# Patient Record
Sex: FEMALE | Race: ASIAN | Employment: FULL TIME | URBAN - METROPOLITAN AREA
[De-identification: names, ages, dates, MRNs, and addresses within clinical notes are randomized per-mention and may not be internally consistent; named-entity substitution may affect disease eponyms.]

---

## 2017-06-12 ENCOUNTER — HOSPITAL ENCOUNTER (OUTPATIENT)
Dept: MAMMOGRAPHY | Age: 37
Discharge: HOME | End: 2017-06-12
Attending: FAMILY MEDICINE
Payer: COMMERCIAL

## 2017-06-12 DIAGNOSIS — Z12.39 SCREENING FOR BREAST CANCER: ICD-10-CM

## 2017-06-12 PROCEDURE — 77067 SCR MAMMO BI INCL CAD: CPT

## 2018-08-06 ENCOUNTER — HOSPITAL ENCOUNTER (OUTPATIENT)
Dept: MAMMOGRAPHY | Age: 38
Discharge: HOME | End: 2018-08-06
Attending: FAMILY MEDICINE
Payer: COMMERCIAL

## 2018-08-06 DIAGNOSIS — Z12.39 SCREENING FOR BREAST CANCER: ICD-10-CM

## 2018-08-06 PROCEDURE — 77067 SCR MAMMO BI INCL CAD: CPT

## 2019-09-30 ENCOUNTER — HOSPITAL ENCOUNTER (OUTPATIENT)
Dept: MAMMOGRAPHY | Age: 39
Discharge: HOME | End: 2019-09-30
Attending: FAMILY MEDICINE
Payer: COMMERCIAL

## 2019-09-30 DIAGNOSIS — Z12.31 VISIT FOR SCREENING MAMMOGRAM: ICD-10-CM

## 2019-09-30 PROCEDURE — 77067 SCR MAMMO BI INCL CAD: CPT

## 2020-09-21 ENCOUNTER — HOSPITAL ENCOUNTER (OUTPATIENT)
Dept: MAMMOGRAPHY | Age: 40
Discharge: HOME | End: 2020-09-21
Attending: FAMILY MEDICINE
Payer: COMMERCIAL

## 2020-09-21 DIAGNOSIS — Z12.39 SCREENING FOR BREAST CANCER: ICD-10-CM

## 2020-09-21 PROCEDURE — 77067 SCR MAMMO BI INCL CAD: CPT

## 2021-11-27 PROBLEM — O09.529 HIGH RISK PREGNANCY, MULTIGRAVIDA OF ADVANCED MATERNAL AGE: Status: ACTIVE | Noted: 2021-11-27

## 2022-01-24 PROBLEM — O36.5990 PREGNANCY AFFECTED BY FETAL GROWTH RESTRICTION: Status: ACTIVE | Noted: 2022-01-24

## 2022-01-24 PROBLEM — O09.522 HIGH RISK PREGNANCY, MULTIGRAVIDA OF ADVANCED MATERNAL AGE IN SECOND TRIMESTER: Status: ACTIVE | Noted: 2021-11-27

## 2022-03-19 PROBLEM — O36.5990 PREGNANCY AFFECTED BY FETAL GROWTH RESTRICTION: Status: ACTIVE | Noted: 2022-01-24

## 2022-04-02 PROBLEM — O09.523 HIGH RISK PREGNANCY, MULTIGRAVIDA OF ADVANCED MATERNAL AGE IN THIRD TRIMESTER: Status: ACTIVE | Noted: 2021-11-27

## 2022-05-19 VITALS — SYSTOLIC BLOOD PRESSURE: 114 MMHG | DIASTOLIC BLOOD PRESSURE: 78 MMHG | WEIGHT: 161 LBS | BODY MASS INDEX: 31.44 KG/M2

## 2022-05-23 ENCOUNTER — ROUTINE PRENATAL (OUTPATIENT)
Dept: OBGYN CLINIC | Age: 42
End: 2022-05-23
Payer: COMMERCIAL

## 2022-05-23 VITALS — DIASTOLIC BLOOD PRESSURE: 78 MMHG | BODY MASS INDEX: 31.44 KG/M2 | SYSTOLIC BLOOD PRESSURE: 118 MMHG | WEIGHT: 161 LBS

## 2022-05-23 DIAGNOSIS — O09.93 HIGH-RISK PREGNANCY IN THIRD TRIMESTER: Primary | ICD-10-CM

## 2022-05-23 DIAGNOSIS — O09.523 MULTIGRAVIDA OF ADVANCED MATERNAL AGE IN THIRD TRIMESTER: ICD-10-CM

## 2022-05-23 PROCEDURE — 76819 FETAL BIOPHYS PROFIL W/O NST: CPT | Performed by: OBSTETRICS & GYNECOLOGY

## 2022-05-23 PROCEDURE — 0502F SUBSEQUENT PRENATAL CARE: CPT | Performed by: OBSTETRICS & GYNECOLOGY

## 2022-05-24 NOTE — PROGRESS NOTES
41yo [id number] at 38w3d for LENNY:    No change in SVE (2cm). IOL sched for Friday, pt requests moving to Saturday. Will speak with MD on call. +FM, no VB.

## 2022-05-25 ENCOUNTER — TELEPHONE (OUTPATIENT)
Dept: OBGYN CLINIC | Age: 42
End: 2022-05-25

## 2022-05-25 NOTE — TELEPHONE ENCOUNTER
Per Dr. Bonita Powers call pt to let her know that induction will remain as scheduled on Friday AM (5/27/22). Called pt, no answer - LM instructing patient to please return my call. 5/26/22 called pt back, no answer - LM instructing patient to please return my call.  called back and I made him aware that induction is scheduled for 5/27/22 - 5:00 AM.  states he cannot get off work and does not have anyone to drop their child off at school.  is asking for induction to be moved to Saturday. Annabella Stockton called Dr. Sarita Ware and said she that she does not advise induction to be moved as it is medical indicated at 39 weeks for growth restrictions. If pt desires induction can be moved, give kick counts. Spoke back with  and he said they want to go in on Saturday instead, he requests 11:00AM instead of 7. Per Annabella Stockton, its 7:00 AM no exceptions.  states he has to work but if he does not have a choice he will make the [employer] work for Saturday. Hospital address given. Kick counts given,  states baby is moving fine and all the time.

## 2022-05-27 ENCOUNTER — PREP FOR PROCEDURE (OUTPATIENT)
Dept: OBGYN | Age: 42
End: 2022-05-27

## 2022-05-27 ENCOUNTER — TELEPHONE (OUTPATIENT)
Dept: OBGYN CLINIC | Age: 42
End: 2022-05-27

## 2022-05-27 RX ORDER — CLINDAMYCIN PHOSPHATE 900 MG/50ML
900 INJECTION INTRAVENOUS EVERY 8 HOURS
Status: CANCELLED | OUTPATIENT
Start: 2022-05-27

## 2022-05-27 RX ORDER — TERBUTALINE SULFATE 1 MG/ML
0.25 INJECTION, SOLUTION SUBCUTANEOUS ONCE
Status: CANCELLED | OUTPATIENT
Start: 2022-05-27 | End: 2022-05-27

## 2022-05-27 RX ORDER — SODIUM CHLORIDE 9 MG/ML
25 INJECTION, SOLUTION INTRAVENOUS PRN
Status: CANCELLED | OUTPATIENT
Start: 2022-05-27

## 2022-05-27 RX ORDER — SODIUM CHLORIDE, SODIUM LACTATE, POTASSIUM CHLORIDE, AND CALCIUM CHLORIDE .6; .31; .03; .02 G/100ML; G/100ML; G/100ML; G/100ML
500 INJECTION, SOLUTION INTRAVENOUS PRN
Status: CANCELLED | OUTPATIENT
Start: 2022-05-27

## 2022-05-27 RX ORDER — ONDANSETRON 2 MG/ML
4 INJECTION INTRAMUSCULAR; INTRAVENOUS EVERY 6 HOURS PRN
Status: CANCELLED | OUTPATIENT
Start: 2022-05-27

## 2022-05-27 RX ORDER — SODIUM CHLORIDE 0.9 % (FLUSH) 0.9 %
5-40 SYRINGE (ML) INJECTION PRN
Status: CANCELLED | OUTPATIENT
Start: 2022-05-27

## 2022-05-27 RX ORDER — SODIUM CHLORIDE 0.9 % (FLUSH) 0.9 %
5-40 SYRINGE (ML) INJECTION EVERY 12 HOURS SCHEDULED
Status: CANCELLED | OUTPATIENT
Start: 2022-05-27

## 2022-05-27 RX ORDER — SODIUM CHLORIDE, SODIUM LACTATE, POTASSIUM CHLORIDE, AND CALCIUM CHLORIDE .6; .31; .03; .02 G/100ML; G/100ML; G/100ML; G/100ML
1000 INJECTION, SOLUTION INTRAVENOUS PRN
Status: CANCELLED | OUTPATIENT
Start: 2022-05-27

## 2022-05-27 RX ORDER — SODIUM CHLORIDE, SODIUM LACTATE, POTASSIUM CHLORIDE, CALCIUM CHLORIDE 600; 310; 30; 20 MG/100ML; MG/100ML; MG/100ML; MG/100ML
INJECTION, SOLUTION INTRAVENOUS CONTINUOUS
Status: CANCELLED | OUTPATIENT
Start: 2022-05-27

## 2022-05-27 RX ORDER — BUTORPHANOL TARTRATE 1 MG/ML
1 INJECTION, SOLUTION INTRAMUSCULAR; INTRAVENOUS
Status: CANCELLED | OUTPATIENT
Start: 2022-05-27

## 2022-05-27 NOTE — TELEPHONE ENCOUNTER
Hernandez called regarding the above patient's induction date. Hernandez advised that the hospital will contact, but also given the phone number to the hospital to call. Hernandez verbalized understanding.

## 2022-05-28 ENCOUNTER — HOSPITAL ENCOUNTER (INPATIENT)
Age: 42
LOS: 1 days | Discharge: HOME | End: 2022-05-29
Attending: OBSTETRICS & GYNECOLOGY | Admitting: OBSTETRICS & GYNECOLOGY
Payer: COMMERCIAL

## 2022-05-28 PROBLEM — Z34.90 ENCOUNTER FOR ELECTIVE INDUCTION OF LABOR: Status: ACTIVE | Noted: 2022-05-28

## 2022-05-28 LAB
ABO + RH BLD: NORMAL
BASOPHILS # BLD: 0 K/UL (ref 0–0.2)
BASOPHILS NFR BLD: 1 % (ref 0–2)
BLOOD GROUP ANTIBODIES SERPL: NORMAL
DIFFERENTIAL METHOD BLD: ABNORMAL
EOSINOPHIL # BLD: 0.1 K/UL (ref 0–0.8)
EOSINOPHIL NFR BLD: 2 % (ref 0.5–7.8)
ERYTHROCYTE [DISTWIDTH] IN BLOOD BY AUTOMATED COUNT: 13.1 % (ref 11.9–14.6)
HCT VFR BLD AUTO: 38.5 % (ref 35.8–46.3)
HGB BLD-MCNC: 12.8 G/DL (ref 11.7–15.4)
IMM GRANULOCYTES # BLD AUTO: 0 K/UL (ref 0–0.5)
IMM GRANULOCYTES NFR BLD AUTO: 0 % (ref 0–5)
LYMPHOCYTES # BLD: 1.7 K/UL (ref 0.5–4.6)
LYMPHOCYTES NFR BLD: 25 % (ref 13–44)
MCH RBC QN AUTO: 33.2 PG (ref 26.1–32.9)
MCHC RBC AUTO-ENTMCNC: 33.2 G/DL (ref 31.4–35)
MCV RBC AUTO: 100 FL (ref 79.6–97.8)
MONOCYTES # BLD: 0.4 K/UL (ref 0.1–1.3)
MONOCYTES NFR BLD: 6 % (ref 4–12)
NEUTS SEG # BLD: 4.7 K/UL (ref 1.7–8.2)
NEUTS SEG NFR BLD: 67 % (ref 43–78)
NRBC # BLD: 0 K/UL (ref 0–0.2)
PLATELET # BLD AUTO: 295 K/UL (ref 150–450)
PMV BLD AUTO: 8.8 FL (ref 9.4–12.3)
RBC # BLD AUTO: 3.85 M/UL (ref 4.05–5.2)
SPECIMEN EXP DATE BLD: NORMAL
WBC # BLD AUTO: 7.1 K/UL (ref 4.3–11.1)

## 2022-05-28 PROCEDURE — 1100000000 HC RM PRIVATE

## 2022-05-28 PROCEDURE — 7210000100 HC LABOR FEE PER 1 HR

## 2022-05-28 PROCEDURE — 7100000000 HC PACU RECOVERY - FIRST 15 MIN

## 2022-05-28 PROCEDURE — 7220000101 HC DELIVERY VAGINAL/SINGLE

## 2022-05-28 PROCEDURE — 7100000001 HC PACU RECOVERY - ADDTL 15 MIN

## 2022-05-28 PROCEDURE — 2580000003 HC RX 258: Performed by: OBSTETRICS & GYNECOLOGY

## 2022-05-28 PROCEDURE — 2500000003 HC RX 250 WO HCPCS

## 2022-05-28 PROCEDURE — 86901 BLOOD TYPING SEROLOGIC RH(D): CPT

## 2022-05-28 PROCEDURE — 0KQM0ZZ REPAIR PERINEUM MUSCLE, OPEN APPROACH: ICD-10-PCS | Performed by: OBSTETRICS & GYNECOLOGY

## 2022-05-28 PROCEDURE — 59400 OBSTETRICAL CARE: CPT | Performed by: OBSTETRICS & GYNECOLOGY

## 2022-05-28 PROCEDURE — 6360000002 HC RX W HCPCS: Performed by: OBSTETRICS & GYNECOLOGY

## 2022-05-28 PROCEDURE — 59300 EPISIOTOMY OR VAGINAL REPAIR: CPT

## 2022-05-28 PROCEDURE — 85025 COMPLETE CBC W/AUTO DIFF WBC: CPT

## 2022-05-28 RX ORDER — SODIUM CHLORIDE, SODIUM LACTATE, POTASSIUM CHLORIDE, CALCIUM CHLORIDE 600; 310; 30; 20 MG/100ML; MG/100ML; MG/100ML; MG/100ML
INJECTION, SOLUTION INTRAVENOUS CONTINUOUS
Status: DISCONTINUED | OUTPATIENT
Start: 2022-05-28 | End: 2022-05-28

## 2022-05-28 RX ORDER — POLYETHYLENE GLYCOL 3350 17 G/17G
17 POWDER, FOR SOLUTION ORAL DAILY
Status: DISCONTINUED | OUTPATIENT
Start: 2022-05-28 | End: 2022-05-29 | Stop reason: HOSPADM

## 2022-05-28 RX ORDER — SODIUM CHLORIDE 0.9 % (FLUSH) 0.9 %
5-40 SYRINGE (ML) INJECTION EVERY 12 HOURS SCHEDULED
Status: DISCONTINUED | OUTPATIENT
Start: 2022-05-28 | End: 2022-05-28

## 2022-05-28 RX ORDER — TERBUTALINE SULFATE 1 MG/ML
0.25 INJECTION, SOLUTION SUBCUTANEOUS ONCE
Status: DISCONTINUED | OUTPATIENT
Start: 2022-05-28 | End: 2022-05-28

## 2022-05-28 RX ORDER — IBUPROFEN 600 MG/1
600 TABLET ORAL EVERY 6 HOURS
Status: DISCONTINUED | OUTPATIENT
Start: 2022-05-28 | End: 2022-05-29 | Stop reason: HOSPADM

## 2022-05-28 RX ORDER — ONDANSETRON 8 MG/1
8 TABLET, ORALLY DISINTEGRATING ORAL EVERY 8 HOURS PRN
Status: DISCONTINUED | OUTPATIENT
Start: 2022-05-28 | End: 2022-05-29 | Stop reason: HOSPADM

## 2022-05-28 RX ORDER — SODIUM CHLORIDE, SODIUM LACTATE, POTASSIUM CHLORIDE, CALCIUM CHLORIDE 600; 310; 30; 20 MG/100ML; MG/100ML; MG/100ML; MG/100ML
INJECTION, SOLUTION INTRAVENOUS CONTINUOUS
Status: DISCONTINUED | OUTPATIENT
Start: 2022-05-28 | End: 2022-05-29

## 2022-05-28 RX ORDER — MISOPROSTOL 200 UG/1
200 TABLET ORAL PRN
Status: DISCONTINUED | OUTPATIENT
Start: 2022-05-28 | End: 2022-05-29 | Stop reason: HOSPADM

## 2022-05-28 RX ORDER — SODIUM CHLORIDE 9 MG/ML
25 INJECTION, SOLUTION INTRAVENOUS PRN
Status: DISCONTINUED | OUTPATIENT
Start: 2022-05-28 | End: 2022-05-28

## 2022-05-28 RX ORDER — ACETAMINOPHEN 325 MG/1
650 TABLET ORAL EVERY 6 HOURS
Status: DISCONTINUED | OUTPATIENT
Start: 2022-05-28 | End: 2022-05-29 | Stop reason: HOSPADM

## 2022-05-28 RX ORDER — LIDOCAINE HYDROCHLORIDE 10 MG/ML
INJECTION, SOLUTION INFILTRATION; PERINEURAL
Status: COMPLETED
Start: 2022-05-28 | End: 2022-05-28

## 2022-05-28 RX ORDER — METHYLERGONOVINE MALEATE 0.2 MG/ML
200 INJECTION INTRAVENOUS PRN
Status: DISCONTINUED | OUTPATIENT
Start: 2022-05-28 | End: 2022-05-29 | Stop reason: HOSPADM

## 2022-05-28 RX ORDER — SIMETHICONE 80 MG
80 TABLET,CHEWABLE ORAL EVERY 6 HOURS PRN
Status: DISCONTINUED | OUTPATIENT
Start: 2022-05-28 | End: 2022-05-29 | Stop reason: HOSPADM

## 2022-05-28 RX ORDER — SODIUM CHLORIDE, SODIUM LACTATE, POTASSIUM CHLORIDE, AND CALCIUM CHLORIDE .6; .31; .03; .02 G/100ML; G/100ML; G/100ML; G/100ML
500 INJECTION, SOLUTION INTRAVENOUS PRN
Status: DISCONTINUED | OUTPATIENT
Start: 2022-05-28 | End: 2022-05-28

## 2022-05-28 RX ORDER — SODIUM CHLORIDE 0.9 % (FLUSH) 0.9 %
5-40 SYRINGE (ML) INJECTION EVERY 12 HOURS SCHEDULED
Status: DISCONTINUED | OUTPATIENT
Start: 2022-05-28 | End: 2022-05-29

## 2022-05-28 RX ORDER — DEXTROSE, SODIUM CHLORIDE, SODIUM LACTATE, POTASSIUM CHLORIDE, AND CALCIUM CHLORIDE 5; .6; .31; .03; .02 G/100ML; G/100ML; G/100ML; G/100ML; G/100ML
INJECTION, SOLUTION INTRAVENOUS CONTINUOUS
Status: DISCONTINUED | OUTPATIENT
Start: 2022-05-28 | End: 2022-05-28

## 2022-05-28 RX ORDER — LANOLIN
CREAM (ML) TOPICAL PRN
Status: DISCONTINUED | OUTPATIENT
Start: 2022-05-28 | End: 2022-05-29 | Stop reason: HOSPADM

## 2022-05-28 RX ORDER — LIDOCAINE 50 MG/G
OINTMENT TOPICAL PRN
Status: DISCONTINUED | OUTPATIENT
Start: 2022-05-28 | End: 2022-05-28

## 2022-05-28 RX ORDER — OXYCODONE HYDROCHLORIDE 5 MG/1
5 TABLET ORAL EVERY 4 HOURS PRN
Status: DISCONTINUED | OUTPATIENT
Start: 2022-05-28 | End: 2022-05-29 | Stop reason: HOSPADM

## 2022-05-28 RX ORDER — SODIUM CHLORIDE 0.9 % (FLUSH) 0.9 %
5-40 SYRINGE (ML) INJECTION PRN
Status: DISCONTINUED | OUTPATIENT
Start: 2022-05-28 | End: 2022-05-29 | Stop reason: HOSPADM

## 2022-05-28 RX ORDER — SODIUM CHLORIDE, SODIUM LACTATE, POTASSIUM CHLORIDE, AND CALCIUM CHLORIDE .6; .31; .03; .02 G/100ML; G/100ML; G/100ML; G/100ML
1000 INJECTION, SOLUTION INTRAVENOUS PRN
Status: DISCONTINUED | OUTPATIENT
Start: 2022-05-28 | End: 2022-05-28

## 2022-05-28 RX ORDER — SODIUM CHLORIDE 9 MG/ML
INJECTION, SOLUTION INTRAVENOUS PRN
Status: DISCONTINUED | OUTPATIENT
Start: 2022-05-28 | End: 2022-05-29 | Stop reason: HOSPADM

## 2022-05-28 RX ORDER — ONDANSETRON 2 MG/ML
4 INJECTION INTRAMUSCULAR; INTRAVENOUS EVERY 6 HOURS PRN
Status: DISCONTINUED | OUTPATIENT
Start: 2022-05-28 | End: 2022-05-28

## 2022-05-28 RX ORDER — SODIUM CHLORIDE 0.9 % (FLUSH) 0.9 %
5-40 SYRINGE (ML) INJECTION PRN
Status: DISCONTINUED | OUTPATIENT
Start: 2022-05-28 | End: 2022-05-28

## 2022-05-28 RX ADMIN — LIDOCAINE HYDROCHLORIDE 200 MG: 10 INJECTION, SOLUTION INFILTRATION; PERINEURAL at 10:58

## 2022-05-28 RX ADMIN — SODIUM CHLORIDE, SODIUM LACTATE, POTASSIUM CHLORIDE, CALCIUM CHLORIDE AND DEXTROSE MONOHYDRATE: 5; 600; 310; 30; 20 INJECTION, SOLUTION INTRAVENOUS at 08:23

## 2022-05-28 RX ADMIN — Medication 1 MILLI-UNITS/MIN: at 08:23

## 2022-05-28 RX ADMIN — Medication 166.7 ML: at 10:48

## 2022-05-28 RX ADMIN — SODIUM CHLORIDE, POTASSIUM CHLORIDE, SODIUM LACTATE AND CALCIUM CHLORIDE 1000 ML: 600; 310; 30; 20 INJECTION, SOLUTION INTRAVENOUS at 12:00

## 2022-05-28 NOTE — PROGRESS NOTES
Voided moderated amount. Assisted back to bed. No complaints. Declines Ibuprofen at this time. States she would prefer to not take anything and if she should need anything for pain she would prefer to ask for it. Spouse at bedside. Pt. Verbalized understanding to call RN for assistance out of bed and/or for any questions or concerns.

## 2022-05-28 NOTE — PROGRESS NOTES
RN at bedside for assistance up to bathroom to void.  Pt. Mart Ceballos well with some assistance from spouse

## 2022-05-28 NOTE — H&P
Labor Induction Admission Note    Jesús Tang  [id number]  Estimated Date of Delivery: 6/3/22     OB History        3    Para   2    Term   2            AB        Living   2       SAB        IAB        Ectopic        Molar        Multiple        Live Births   2                Patient admitted with pregnancy at 36w3d for induction of labor due to IUGR. Prenatal course was complicated by IUGR and AMA. Please see prenatal records for details.       Current Facility-Administered Medications   Medication Dose Route Frequency Provider Last Rate Last Admin    0.9 % sodium chloride infusion  25 mL IntraVENous PRN Sarah L Dishler, DO        butorphanol (STADOL) injection 1 mg  1 mg IntraVENous Q3H PRN Sarah L Dishler, DO        lactated ringers bolus  500 mL IntraVENous PRN Sarah L Dishler, DO        Or    lactated ringers bolus  1,000 mL IntraVENous PRN Sarah L Dishler, DO        lactated ringers infusion   IntraVENous Continuous Sarah L Dishler, DO        ondansetron (ZOFRAN) injection 4 mg  4 mg IntraVENous Q6H PRN Sarah L Dishler, DO        oxytocin (PITOCIN) 30 units in 500 mL infusion  1-20 fahad-units/min IntraVENous Continuous Sarah L Dishler, DO 2 mL/hr at 22 0853 2 fahad-units/min at 22 0853    oxytocin (PITOCIN) 30 units in 500 mL infusion  87.3 fahad-units/min IntraVENous Continuous PRN Sarah L Dishler, DO        And    oxytocin (PITOCIN) 10 unit bolus from the bag  10 Units IntraVENous PRN Sarah L Dishler, DO        sodium chloride flush 0.9 % injection 5-40 mL  5-40 mL IntraVENous 2 times per day Sarah L Dishler, DO        sodium chloride flush 0.9 % injection 5-40 mL  5-40 mL IntraVENous PRN Sarah L Dishler, DO        terbutaline (BRETHINE) injection 0.25 mg  0.25 mg SubCUTAneous Once Sarah L Dishler, DO        dextrose 5 % in lactated ringers infusion   IntraVENous Continuous Sarah L Dishler,  mL/hr at 22 0823 New Bag at 22 7979        EXAM: Cervical Exam: 3/50/-2. SROM occurred while RNs were checking her in with copious clear fluid                Fetal Heart Rate:  Cat 1      Labs:   Lab Results   Component Value Date    ABORH B POSITIVE 05/28/2022    HIVEXT Negative 12/01/2011    RPREXT Non-reactive 12/01/2011          Plan: Admit for induction of labor. Group B Strep negative. Can have epidural when she desires.  Planning to go natural         The DO Alanna  May 28, 2022   9:14 AM

## 2022-05-28 NOTE — PROGRESS NOTES
Safety Teaching reviewed:   1. Hand hygiene prior to handling the infant. 2. Use of bulb syringe  3. Bracelets with matching numbers are placed on mother and infant  3. An infant security tag  [facility]) is placed on the infant's ankle and monitored  5. All OB nurses wear pink Employee badges - do not give your baby to anyone without proper identification. 6. Never leave the baby alone in the room. 7. The infant should be placed on their back to sleep. on a firm mattress. No toys should be placed in the crib. (safe sleep video offered to view)  8. Never shake the baby (video offered to view)  9. Infant fall prevention - do not sleep with the baby, and place the baby in the crib while ambulating. 8. Mother and Baby Care booklet given to Mother.

## 2022-05-28 NOTE — PROGRESS NOTES
SBAR IN Report: Mother    Verbal report received from Carlos Lloyd RN on this patient, who is now being transferred from [facility] (unit) for routine progression of patient care. The patient is not wearing a green \"Anesthesia-Duramorph\" band. Report consisted of patient's Situation, Background, Assessment and Recommendations (SBAR). [facility] ID bands were compared with the identification form, and verified with the patient and transferring nurse. Information from the Nurse Handoff Report and the [place] Report was reviewed with the transferring nurse; opportunity for questions and clarification provided.

## 2022-05-28 NOTE — LACTATION NOTE
This note was copied from a baby's chart. In to see mom and infant for time. Lots of company in room so mom not able to talk long. She feels baby is latching and feeding well. She had no questions or needs at this time.  Lactation to follow up in am.

## 2022-05-28 NOTE — L&D DELIVERY NOTE
Mother's Information    Labor Events    Cervical Ripening:   Now         Jewel Nails [[id number]]    Labor Events    Cervical Ripening Date/Time:     Rupture Date/Time: 22 07:50:00   Rupture Type: SROM  Fluid Color: Clear  Fluid Odor: None  Labor Complications: None     Anesthesia    Method: Local     Start Pushing    Labor onset date/time:   Now   Dilation complete date/time: 22 10:46:00 Now   Start pushing date/time: 2022 10:46:00   Decision date/time (emergent ):       Delivery ([place])    Delivery Date/Time:  22 10:46:00   Delivery Type: Vaginal, Spontaneous    Details:         Presentation    Presentation: Vertex  Position: Left  _: Occiput  _: Anterior     Shoulder Dystocia    Shoulder Dystocia Present?: No  Add Second Maneuver  Add Third Maneuver  Add Fourth Maneuver  Add Fifth Maneuver  Add Sixth Maneuver  Add Seventh Maneuver  Add Eighth Maneuver  Add Ninth Maneuver     Assisted Delivery Details    Forceps Attempted?: No  Vacuum Extractor Attempted?: No     Document Additional Attempt       Document Additional Attempt             Cord    Vessels: 3 Vessels  Complications: None  Delayed Cord Clamping?: Yes  Cord Clamped Date/Time: 2022 10:48:00  Cord Blood Disposition: Lab  Gases Sent?: No     Placenta    Date/Time: 2022 10:49:00  Removal: Spontaneous  Appearance: Intact  Disposition: Discarded     Lacerations    Perineal Lacerations: 2nd  Other Lacerations: periurethral laceration  Number of Repair Packets: 1     Vaginal Counts    Initial Count Personnel: AH,RN AND BB,RN  Initial Count Verified By: Cyndi Knox    Sponges Needles Instruments   Initial Counts Correct Correct    Final Counts Correct Correct    If the count is incorrect due to Intentionally Retained Foreign Object (IRFO) add the IRFO LDA in Lines/Drains.   Add LDA: Link to [facility]     Blood Loss  Mother: Krista Hernández #[id number]   Start of Mother's Information    Delivery Blood Loss  22 2246 - 22 1114    None           End of Mother's Information  Mother: Fer Tavera #[id number]          Delivery Providers    Delivering clinician: Sully Sanders DO     Provider Role    Sully Sanders DO Obstetrician    Beverly Bonilla, RN Primary Nurse    Lisa Sweeney, RN Primary  Nurse    JEREMY Barton [facility]    Ann Pickens, RN Charge Nurse           Assessment    Living Status: Living     Apgar Scoring Key:    0 1 2    Skin Color: Blue or pale Acrocyanotic Completely pink    Heart Rate: Absent <100 bpm >100 bpm    Reflex Irritability: No response Grimace Cry or active withdrawal    Muscle Tone: Limp Some flexion Active motion    Respiratory Effort: Absent Weak cry; hypoventilation Good, crying                  Skin Color:   Heart Rate:   Reflex Irritability:   Muscle Tone:   Respiratory Effort: Total:            1 Minute:    0    2    2    2    2    8        Apgar 1 total from OB History    5 Minute:    1    2    2    2    2    9        Apgar 5 total from OB History    10 Minute:              15 Minute:              20 Minute:                      Apgars Assigned By: Camila Perez          Resuscitation    Method: Bulb Suction, Room Air, Stimulation           Grady Measurements           Title    Skin to Skin Initiation Date/Time:     Skin to Skin End Date/Time:                I was in another room completing delivery and was told patient was 9 cm and without epidural. As soon as I finished the other delivery I went to room and as I walked in baby was being deliveryed by RN. Baby at perineum with vigorous cry.  of a Viable I on 22  Apgars 8, 9. Delayed cord clamping, baby to mom. Cord clamped/cut. Placenta delivered S/I/3VC. A second degree lac noted area was injected with 2% lidocaine and was repaired in the usual fashion with 3.0 Vicryl. FF w/ pit and massage. QBL per RN. Mom/baby stable.        Sarah Mendenhall DO

## 2022-05-28 NOTE — LACTATION NOTE

## 2022-05-28 NOTE — PROGRESS NOTES
SBAR OUT Report: Mother    Verbal report given to Miguel Song RN on this patient, who is now being transferred to  for routine progression of patient care. The patient is not wearing a green \"Anesthesia-Duramorph\" band. Report consisted of patient's Situation, Background, Assessment and Recommendations (SBAR).  ID bands were compared with the identification form, and verified with the patient and receiving nurse. Information from the Nurse Handoff Report, Intake/Output and MAR was reviewed with the receiving nurse; opportunity for questions and clarification provided.

## 2022-05-28 NOTE — PLAN OF CARE
Problem: Vaginal Birth or  Section  Goal: Fetal and maternal status remain reassuring during the birth process  Description:  Birth OB-Pregnancy care plan goal which identifies if the fetal and maternal status remain reassuring during the birth process  Outcome: Progressing  Flowsheets (Taken 2022)  Fetal and Maternal Status Remain Reassuring During the Birth Process:   Monitor vital signs   Monitor fetal heart rate   Monitor uterine activity   Monitor labor progression (Vaginal delivery)     Problem: Postpartum  Goal: Experiences normal postpartum course  Description:  Postpartum OB-Pregnancy care plan goal which identifies if the mother is experiencing a normal postpartum course  Outcome: Progressing  Goal: Appropriate maternal -  bonding  Description:  Postpartum OB-Pregnancy care plan goal which identifies if the mother and  are bonding appropriately  Outcome: Progressing  Goal: Establishment of infant feeding pattern  Description:  Postpartum OB-Pregnancy care plan goal which identifies if the mother is establishing a feeding pattern with their   Outcome: Progressing  Goal: Incisions, wounds, or drain sites healing without S/S of infection  Outcome: Progressing  Flowsheets (Taken 2022)  Incisions, Wounds, or Drain Sites Healing Without Sign and Symptoms of Infection: ADMISSION and DAILY: Assess and document risk factors for pressure ulcer development     Problem: Pain  Goal: Verbalizes/displays adequate comfort level or baseline comfort level  Outcome: Progressing  Flowsheets (Taken 2022)  Verbalizes/displays adequate comfort level or baseline comfort level:   Encourage patient to monitor pain and request assistance   Assess pain using appropriate pain scale   Administer analgesics based on type and severity of pain and evaluate response   Implement non-pharmacological measures as appropriate and evaluate response   Consider cultural and social influences on pain and pain management   Notify Licensed Independent Practitioner if interventions unsuccessful or patient reports new pain     Problem: Infection - Adult  Goal: Absence of infection at discharge  Outcome: Progressing  Goal: Absence of infection during hospitalization  Outcome: Progressing  Goal: Absence of fever/infection during anticipated neutropenic period  Outcome: Progressing     Problem: Safety - Adult  Goal: Free from fall injury  Outcome: Progressing  Flowsheets (Taken 5/28/2022 0908)  Free From Fall Injury:   Instruct family/caregiver on patient safety   Based on caregiver fall risk screen, instruct family/caregiver to ask for assistance with transferring infant if caregiver noted to have fall risk factors     Problem: Discharge Planning  Goal: Discharge to home or other facility with appropriate resources  Outcome: Progressing     Problem: Postpartum  Goal: Experiences normal postpartum course  Description:  Postpartum OB-Pregnancy care plan goal which identifies if the mother is experiencing a normal postpartum course  Outcome: Progressing

## 2022-05-28 NOTE — PROGRESS NOTES
Labor Induction Admission Note    Ishan Calder  [id number]  Estimated Date of Delivery: 6/3/22     OB History        3    Para   2    Term   2            AB        Living   2       SAB        IAB        Ectopic        Molar        Multiple        Live Births   2                Patient admitted with pregnancy at 36w3d for induction of labor due to IUGR. Prenatal course was complicated by IUGR and AMA. Please see prenatal records for details.       Current Facility-Administered Medications   Medication Dose Route Frequency Provider Last Rate Last Admin    0.9 % sodium chloride infusion  25 mL IntraVENous PRN Sarah L Dishler, DO        butorphanol (STADOL) injection 1 mg  1 mg IntraVENous Q3H PRN Sarah L Dishler, DO        lactated ringers bolus  500 mL IntraVENous PRN Sarah L Dishler, DO        Or    lactated ringers bolus  1,000 mL IntraVENous PRN Sarah L Dishler, DO        lactated ringers infusion   IntraVENous Continuous Sarah L Dishler, DO        ondansetron (ZOFRAN) injection 4 mg  4 mg IntraVENous Q6H PRN Sarah L Dishler, DO        oxytocin (PITOCIN) 30 units in 500 mL infusion  1-20 fahad-units/min IntraVENous Continuous Sarah L Dishler, DO 2 mL/hr at 22 0853 2 fahad-units/min at 22 0853    oxytocin (PITOCIN) 30 units in 500 mL infusion  87.3 fahad-units/min IntraVENous Continuous PRN Sarah L Dishler, DO        And    oxytocin (PITOCIN) 10 unit bolus from the bag  10 Units IntraVENous PRN Sarah L Dishler, DO        sodium chloride flush 0.9 % injection 5-40 mL  5-40 mL IntraVENous 2 times per day Sarah L Dishler, DO        sodium chloride flush 0.9 % injection 5-40 mL  5-40 mL IntraVENous PRN Sarah L Dishler, DO        terbutaline (BRETHINE) injection 0.25 mg  0.25 mg SubCUTAneous Once Sarah L Dishler, DO        dextrose 5 % in lactated ringers infusion   IntraVENous Continuous Sarah L Dishler,  mL/hr at 22 0823 New Bag at 22 8039        EXAM: Cervical Exam: 3/50/-2. SROM occurred while RNs were checking her in with copious clear fluid                Fetal Heart Rate:  Cat 1      Labs:   Lab Results   Component Value Date    ABORH B POSITIVE 05/28/2022    HIVEXT Negative 12/01/2011    RPREXT Non-reactive 12/01/2011          Plan: Admit for induction of labor. Group B Strep negative. Can have epidural when she desires.  Planning to go natural         The DO Alanna  May 28, 2022   9:14 AM

## 2022-05-29 VITALS
OXYGEN SATURATION: 100 % | RESPIRATION RATE: 16 BRPM | SYSTOLIC BLOOD PRESSURE: 111 MMHG | TEMPERATURE: 97.7 F | HEART RATE: 78 BPM | DIASTOLIC BLOOD PRESSURE: 79 MMHG

## 2022-05-29 PROCEDURE — 6370000000 HC RX 637 (ALT 250 FOR IP): Performed by: OBSTETRICS & GYNECOLOGY

## 2022-05-29 RX ORDER — IBUPROFEN 600 MG/1
600 TABLET ORAL EVERY 6 HOURS
Qty: 120 TABLET | Refills: 3 | Status: SHIPPED | OUTPATIENT
Start: 2022-05-29

## 2022-05-29 RX ORDER — CLINDAMYCIN PHOSPHATE 900 MG/50ML
900 INJECTION INTRAVENOUS EVERY 8 HOURS
Status: DISCONTINUED | OUTPATIENT
Start: 2022-05-29 | End: 2022-05-29

## 2022-05-29 RX ADMIN — Medication: at 01:04

## 2022-05-29 NOTE — LACTATION NOTE
This note was copied from a baby's chart. In to see mom and infant for discharge. Mom's 3rd baby. She breast fed first child for 4 months and 2nd child for 1 month. She plans to do both breast and bottle formula feeding w/ this infant. She states baby has been latching and feeding fine, no concerns. Encouraged her to always offer both breasts first, and then formula second if needed. She does not have a pump at home. Encouraged her to check w/ her insurance if interested to see if they cover one first. Reviewed importance to feed baby 8-12 full feeds per day and monitor output. Reviewed discharge info and how to manage period of engorgement. No further needs at this time.

## 2022-05-29 NOTE — DISCHARGE SUMMARY
Discharge Summary     Date of Admission:  2022  6:59 AM  Date of Discharge:  22    Marian Norman 38 y.o. [id number] presented at 36w3d for induction/in active labor. Pt had  without incident. See delivery note for all delivery details. Pt's PP course was uneventful. On day of D/C, she was ambulating well, afebrile, with lochia < menses. She was discharged home with medications as below. Pt was breast feeding on discharge. She plans on undecided for birth control. HTN diagnosis: no   Routine PP instructions given to patient.   She is to follow up with [facility] in 6 weeks for PP exam.         Medication List      START taking these medications    ibuprofen 600 MG tablet  Commonly known as: ADVIL;MOTRIN  Take 1 tablet by mouth every 6 hours        STOP taking these medications    Cholecalciferol 50 MCG (2000) Tabs     PRENATAL PO           Where to Get Your Medications      [facility] [facility]  [address] [facility] [place] [address]    Phone: [phone]   · ibuprofen 600 MG tablet         [address] Gandhi DO  8:52 AM  22

## 2022-05-29 NOTE — LACTATION NOTE

## 2022-05-29 NOTE — PLAN OF CARE
Problem: Vaginal Birth or  Section  Goal: Fetal and maternal status remain reassuring during the birth process  Description:  Birth OB-Pregnancy care plan goal which identifies if the fetal and maternal status remain reassuring during the birth process  2022 by Mariam Clements RN  Outcome: Progressing  2022 by Aditya Holguin RN  Outcome: Progressing  Flowsheets (Taken 2022 0908)  Fetal and Maternal Status Remain Reassuring During the Birth Process:   Monitor vital signs   Monitor fetal heart rate   Monitor uterine activity   Monitor labor progression (Vaginal delivery)     Problem: Postpartum  Goal: Experiences normal postpartum course  Description:  Postpartum OB-Pregnancy care plan goal which identifies if the mother is experiencing a normal postpartum course  2022 by Mariam Clements RN  Outcome: Progressing  2022 by Aditya Holguin RN  Outcome: Progressing  Goal: Appropriate maternal -  bonding  Description:  Postpartum OB-Pregnancy care plan goal which identifies if the mother and  are bonding appropriately  2022 by Mariam Clements RN  Outcome: Progressing  2022 by Aditya Holguin RN  Outcome: Progressing  Goal: Establishment of infant feeding pattern  Description:  Postpartum OB-Pregnancy care plan goal which identifies if the mother is establishing a feeding pattern with their   2022 by Mariam Clements RN  Outcome: Progressing  2022 by Aditya Holguin RN  Outcome: Progressing  Goal: Incisions, wounds, or drain sites healing without S/S of infection  2022 by Mariam Clements RN  Outcome: Progressing  2022 by Aditya Holguin RN  Outcome: Progressing  Flowsheets (Taken 2022 0908)  Incisions, Wounds, or Drain Sites Healing Without Sign and Symptoms of Infection: ADMISSION and DAILY: Assess and document risk factors for pressure ulcer development     Problem: Pain  Goal: Verbalizes/displays adequate comfort level or baseline comfort level  5/28/2022 2217 by Seth Hayden RN  Outcome: Progressing  Flowsheets (Taken 5/28/2022 1930)  Verbalizes/displays adequate comfort level or baseline comfort level:   Encourage patient to monitor pain and request assistance   Assess pain using appropriate pain scale   Administer analgesics based on type and severity of pain and evaluate response   Implement non-pharmacological measures as appropriate and evaluate response   Consider cultural and social influences on pain and pain management   Notify Licensed Independent Practitioner if interventions unsuccessful or patient reports new pain  5/28/2022 0919 by Madelyn Leblanc RN  Outcome: Progressing  Flowsheets (Taken 5/28/2022 0908)  Verbalizes/displays adequate comfort level or baseline comfort level:   Encourage patient to monitor pain and request assistance   Assess pain using appropriate pain scale   Administer analgesics based on type and severity of pain and evaluate response   Implement non-pharmacological measures as appropriate and evaluate response   Consider cultural and social influences on pain and pain management   Notify Licensed Independent Practitioner if interventions unsuccessful or patient reports new pain     Problem: Infection - Adult  Goal: Absence of infection at discharge  5/28/2022 2217 by Seth Hayden RN  Outcome: Progressing  5/28/2022 0919 by Madelyn Leblanc RN  Outcome: Progressing  Goal: Absence of infection during hospitalization  5/28/2022 2217 by Seth Hayden RN  Outcome: Progressing  5/28/2022 0919 by Madelyn Leblanc RN  Outcome: Progressing  Goal: Absence of fever/infection during anticipated neutropenic period  5/28/2022 2217 by Seth Hayden RN  Outcome: Progressing  5/28/2022 0919 by Madelyn Leblanc RN  Outcome: Progressing     Problem: Safety - Adult  Goal: Free from fall injury  5/28/2022 2217 by Seth Hayden RN  Outcome: Progressing  5/28/2022 0919 by Madelyn Leblanc RN  Outcome: Progressing  Flowsheets (Taken 5/28/2022 0908)  Free From Fall Injury:   Instruct family/caregiver on patient safety   Based on caregiver fall risk screen, instruct family/caregiver to ask for assistance with transferring infant if caregiver noted to have fall risk factors     Problem: Discharge Planning  Goal: Discharge to home or other facility with appropriate resources  5/28/2022 2217 by Aj Deleon RN  Outcome: Progressing  5/28/2022 0919 by Gertrudis Shaw RN  Outcome: Progressing

## 2022-06-01 NOTE — ADT AUTH CERT
DR MAHMOOD AS REQUESTED. WE DON'T HAVE PROVIDER'S TAX ID.  YASMINE NPI    REF # [id number]    DR Dyana COOL NPI [id number]   VUTCZLQ 9 [facility] BLDG 1  [facility]# [phone]

## 2022-07-08 DIAGNOSIS — E55.9 VITAMIN D DEFICIENCY: ICD-10-CM

## 2022-07-08 DIAGNOSIS — Z13.29 SCREENING FOR THYROID DISORDER: ICD-10-CM

## 2022-07-08 DIAGNOSIS — Z13.6 SCREENING FOR ISCHEMIC HEART DISEASE: ICD-10-CM

## 2022-07-08 DIAGNOSIS — E78.2 MIXED HYPERLIPIDEMIA: Primary | ICD-10-CM

## 2022-07-11 DIAGNOSIS — Z13.29 SCREENING FOR THYROID DISORDER: ICD-10-CM

## 2022-07-11 DIAGNOSIS — E78.2 MIXED HYPERLIPIDEMIA: ICD-10-CM

## 2022-07-11 DIAGNOSIS — E55.9 VITAMIN D DEFICIENCY: ICD-10-CM

## 2022-07-11 DIAGNOSIS — Z13.6 SCREENING FOR ISCHEMIC HEART DISEASE: ICD-10-CM

## 2022-07-11 LAB
25(OH)D3 SERPL-MCNC: 32.1 NG/ML (ref 30–100)
ALBUMIN SERPL-MCNC: 3.7 G/DL (ref 3.5–5)
ALBUMIN/GLOB SERPL: 0.9 {RATIO} (ref 1.2–3.5)
ALP SERPL-CCNC: 85 U/L (ref 50–136)
ALT SERPL-CCNC: 21 U/L (ref 12–65)
ANION GAP SERPL CALC-SCNC: 4 MMOL/L (ref 7–16)
AST SERPL-CCNC: 19 U/L (ref 15–37)
BILIRUB SERPL-MCNC: 0.4 MG/DL (ref 0.2–1.1)
BUN SERPL-MCNC: 9 MG/DL (ref 6–23)
CALCIUM SERPL-MCNC: 9.5 MG/DL (ref 8.3–10.4)
CHLORIDE SERPL-SCNC: 108 MMOL/L (ref 98–107)
CHOLEST SERPL-MCNC: 244 MG/DL
CO2 SERPL-SCNC: 28 MMOL/L (ref 21–32)
CREAT SERPL-MCNC: 0.6 MG/DL (ref 0.6–1)
ERYTHROCYTE [DISTWIDTH] IN BLOOD BY AUTOMATED COUNT: 11.9 % (ref 11.9–14.6)
GLOBULIN SER CALC-MCNC: 4.1 G/DL (ref 2.3–3.5)
GLUCOSE SERPL-MCNC: 83 MG/DL (ref 65–100)
HCT VFR BLD AUTO: 44.4 % (ref 35.8–46.3)
HDLC SERPL-MCNC: 40 MG/DL (ref 40–60)
HDLC SERPL: 6.1 {RATIO}
HGB BLD-MCNC: 14.5 G/DL (ref 11.7–15.4)
LDLC SERPL CALC-MCNC: 159.8 MG/DL
MCH RBC QN AUTO: 31.9 PG (ref 26.1–32.9)
MCHC RBC AUTO-ENTMCNC: 32.7 G/DL (ref 31.4–35)
MCV RBC AUTO: 97.6 FL (ref 79.6–97.8)
NRBC # BLD: 0 K/UL (ref 0–0.2)
PLATELET # BLD AUTO: 343 K/UL (ref 150–450)
PMV BLD AUTO: 9 FL (ref 9.4–12.3)
POTASSIUM SERPL-SCNC: 4.1 MMOL/L (ref 3.5–5.1)
PROT SERPL-MCNC: 7.8 G/DL (ref 6.3–8.2)
RBC # BLD AUTO: 4.55 M/UL (ref 4.05–5.2)
SODIUM SERPL-SCNC: 140 MMOL/L (ref 136–145)
TRIGL SERPL-MCNC: 221 MG/DL (ref 35–150)
TSH, 3RD GENERATION: 2.09 UIU/ML (ref 0.36–3.74)
VLDLC SERPL CALC-MCNC: 44.2 MG/DL (ref 6–23)
WBC # BLD AUTO: 4.9 K/UL (ref 4.3–11.1)

## 2022-07-20 NOTE — PROGRESS NOTES
7/21/2022     Subjective:     Chief Complaint   Patient presents with    Annual Exam     Had a physical.  She just had a baby 2 months ago. Follow-up       HPI:       Health Maintenance     Last Physical 2019  Last PAP 2021  Last Mammogram 9/2020 currently nursing  Last colonoscopy no  Last bone density no  Last eye exam 2017  Last dental exam 2020 schedule for October  Exercise gym, 3 times a week     Immunizations  Pneumonia - Prevnar no  Pneumovax no  Tdap 2022  TD no  Shingles never had chickenpox  Flu vaccine refuses  Covid refuses    2 months postpartum, still nursing would like to go on patch for birth control. Hyperlipidemia  The patient is following a low fat diet and is not exercising regularly. Patient is not on any medication at this time. Patient is not having associated symptoms of shortness of breath, chest pain, rapid heart rate, irregular heart rate, and dizziness    Patient labs are YOUR LAST LIPID PROFILE:   Lab Results   Component Value Date    CHOL 244 (H) 07/11/2022    CHOL 186 08/25/2020    CHOL 176 08/23/2019     Lab Results   Component Value Date    TRIG 221 (H) 07/11/2022    TRIG 155 (H) 08/25/2020    TRIG 265 (H) 08/23/2019     Lab Results   Component Value Date    HDL 40 07/11/2022    HDL 46 08/25/2020    HDL 42 08/23/2019     Lab Results   Component Value Date    LDLCALC 159.8 (H) 07/11/2022    LDLCALC 109 (H) 08/25/2020    LDLCALC 81 08/23/2019     Lab Results   Component Value Date    LABVLDL 44.2 (H) 07/11/2022    LABVLDL 31 08/25/2020    LABVLDL 53 (H) 08/23/2019     Lab Results   Component Value Date    CHOLHDLRATIO 6.1 07/11/2022      Vitamin D deficiency  Taking supplement on a irregular basis. Current level:   Lab Results   Component Value Date/Time    VITD25 32.1 07/11/2022 09:53 AM      Postpartum she is due to have a postpartum checkup. She is feeling well. She is also nursing her baby.         Interim History:   5/24 had a normal delivery     Review of Systems Left Upper Arm, Position: Sitting)   Pulse 68   Resp 12   Ht 5' (1.524 m)   Wt 133 lb (60.3 kg)   LMP 08/27/2021   SpO2 98%   Breastfeeding No   BMI 25.97 kg/m²     Physical Exam  Constitutional:       Appearance: Normal appearance. HENT:      Head: Normocephalic. Right Ear: Tympanic membrane, ear canal and external ear normal.      Left Ear: Tympanic membrane, ear canal and external ear normal.      Nose: Nose normal.      Mouth/Throat:      Mouth: Mucous membranes are dry. Pharynx: Oropharynx is clear. Eyes:      Conjunctiva/sclera: Conjunctivae normal.      Pupils: Pupils are equal, round, and reactive to light. Cardiovascular:      Rate and Rhythm: Normal rate and regular rhythm. Pulses: Normal pulses. Heart sounds: No murmur heard. Pulmonary:      Effort: Pulmonary effort is normal. No respiratory distress. Breath sounds: No wheezing. Chest:      Comments: Breast exam is deferred  Abdominal:      General: Abdomen is flat. There is no distension. Palpations: There is no mass. Tenderness: There is no abdominal tenderness. There is no guarding. Musculoskeletal:         General: No deformity. Cervical back: Normal range of motion and neck supple. Lymphadenopathy:      Cervical: No cervical adenopathy. Skin:     General: Skin is warm and dry. Neurological:      General: No focal deficit present. Mental Status: She is alert. Psychiatric:         Mood and Affect: Mood normal.         Behavior: Behavior normal.         Thought Content: Thought content normal.         Judgment: Judgment normal.       Assessment and Plan:      Diagnosis Orders   1. Routine general medical examination at a health care facility        2.  Contraceptive patch status  norelgestromin-ethinyl estradiol (ORTHO EVRA) 150-35 MCG/24HR    DISCONTINUED: norelgestromin-ethinyl estradiol (ORTHO EVRA) 150-35 MCG/24HR    DISCONTINUED: norelgestromin-ethinyl estradiol (ORTHO EVRA) 150-35 MCG/24HR      3. Mixed hyperlipidemia          Data reviewed:  Previous notes, labs and Specialists notes, if any, reviewed and discussed with patient. CPE today, HM reviewed. Will defer mammogram until next year and also a colonoscopy next year. Contraceptive use she would like to go on birth control and is requesting a patch. I have prescribed her Ortho Evra and given her instructions. Postpartum she needs to have her postpartum checkup and she needs to call her GYN for this. HLD this is new. We will plan to recheck on her levels in about 6 months. She has been eating a lot of ice cream since she gave birth. Continue vitamin D supplement. Follow-up with me in 6 months to recheck cholesterol levels. Consider medication if no improvement. Over 50% of today's office visit was spent in face to face time reviewing test results, prognosis, importance of compliance, education about disease process, benefits of medications, instructions for management of disease and follow up plans. Total face to face time spent with patient was at least 25 minutes      There are no Patient Instructions on file for this visit. No follow-up provider specified.     Jadon Barnes MD

## 2022-07-21 ENCOUNTER — OFFICE VISIT (OUTPATIENT)
Dept: INTERNAL MEDICINE CLINIC | Facility: CLINIC | Age: 42
End: 2022-07-21
Payer: COMMERCIAL

## 2022-07-21 VITALS
WEIGHT: 133 LBS | OXYGEN SATURATION: 98 % | BODY MASS INDEX: 26.11 KG/M2 | DIASTOLIC BLOOD PRESSURE: 56 MMHG | SYSTOLIC BLOOD PRESSURE: 94 MMHG | HEIGHT: 60 IN | HEART RATE: 68 BPM | RESPIRATION RATE: 12 BRPM

## 2022-07-21 DIAGNOSIS — Z00.00 ROUTINE GENERAL MEDICAL EXAMINATION AT A HEALTH CARE FACILITY: Primary | ICD-10-CM

## 2022-07-21 DIAGNOSIS — E55.9 VITAMIN D DEFICIENCY: ICD-10-CM

## 2022-07-21 DIAGNOSIS — E78.2 MIXED HYPERLIPIDEMIA: ICD-10-CM

## 2022-07-21 DIAGNOSIS — Z30.45 CONTRACEPTIVE PATCH STATUS: ICD-10-CM

## 2022-07-21 PROCEDURE — 99396 PREV VISIT EST AGE 40-64: CPT | Performed by: FAMILY MEDICINE

## 2022-07-21 ASSESSMENT — ENCOUNTER SYMPTOMS
BLOOD IN STOOL: 0
ABDOMINAL PAIN: 0
BACK PAIN: 0
DIARRHEA: 0
VOMITING: 0
CONSTIPATION: 0
SORE THROAT: 0
EYE PAIN: 0
NAUSEA: 0
SHORTNESS OF BREATH: 0
WHEEZING: 0